# Patient Record
(demographics unavailable — no encounter records)

---

## 2025-03-31 NOTE — DISCUSSION/SUMMARY
[de-identified] : Patient was seen today for evaluation of chronic intermittent left knee pain with recent atraumatic exacerbation due to underlying osteoarthritis.  No recent injury or trauma to the area, no effusion, no evidence of internal derangement.  Patient had very long-lasting relief for 9+ months from cortisone injection provided last visit.  We discussed various treatment options as well as associated risk/benefits/alternatives and patient elected to proceed with repeat cortisone injection today (see procedure note).  Informed the patient that the numbing medicine in today's injection will last for about 4-6 hours. The steroid that was injected will start to work in 1 to 2 days, peak at 1-2 weeks, and may last up to 1-2 months.  We again discussed utilization of low impact exercise, she can continue her walking and [employer] exercise activities.  Follow up as needed.  Patient appreciates and agrees with current plan.  This note was generated using dragon medical dictation software.  A reasonable effort has been made for proofreading its contents, but typos may still remain.  If there are any questions or points of clarification needed please notify my office.

## 2025-03-31 NOTE — PROCEDURE
[de-identified] : Injection: Left knee joint. Indication: Osteoarthritis.  A discussion was had with the patient regarding this procedure and all questions were answered. All risks, benefits and alternatives were discussed. These included but were not limited to bleeding, infection, allergic reaction and reaccumulation of fluid. A timeout was done to ensure correct side and patient agreed to the procedure.  A [place] bernie was created on the skin utilizing a plastic needle cap to bernie the anticipated point of entry.   Alcohol was used to clean the skin, and Betadine was used to sterilize and prep the area in the lateral joint line aspect of the knee. Ethyl chloride spray was then used as a topical anesthetic. A 22-gauge needle was used to inject 2cc of 0.25% bupivacaine without epinephrine and 1cc of 40mg/ml methylprednisolone into the knee. A sterile bandage was then applied. The patient tolerated the procedure well.

## 2025-03-31 NOTE — HISTORY OF PRESENT ILLNESS
[de-identified] : Pt is a 51 yo female who presents with left knee pain  Left knee pain: Started in June 2024 located lateral joint line extending down. Noticed swelling two months denies any injury. The swelling is more bothersome now.  Pain worse with walking or sitting. Has taken tylenol as well as icing and heating the knee with minimal relief. Pt has aspiration in 2022 with significant relief.  She was last seen in August 2024 an received a left knee cortisone injection

## 2025-03-31 NOTE — PHYSICAL EXAM
[de-identified] : Constitutional: Well-nourished, well-developed, No acute distress Respiratory:  Good respiratory effort, no SOB Lymphatic: No regional lymphadenopathy, no lymphedema Psychiatric: Pleasant and normal affect, alert and oriented x3 Musculoskeletal: normal except where as noted in regional exam  Left Knee: APPEARANCE: no marked deformities or malalignment SWELLING:  none  POSITIVE TENDERNESS:  tender lateral joint line.  NONTENDER: medial jt line & retinacula b/l, patellar & quadriceps tendons, MCL/LCL, ITB at the lateral femoral condyle & Gerdy's tubercle, pes bursa.  ROM: mild pain & restriction in flexion to 110 degrees.  RESISTIVE TESTING: painless resisted knee flex/ext.  
normal...

## 2025-06-19 NOTE — ASSESSMENT
[FreeTextEntry1] : Impression and plan Patient came to the office today with complaints of gassy bloating right lower quadrant abdominal discomfort chronic constipation and dyspepsia.  I recommend further testing in the form of upper endoscopy colonoscopy CT scan of the abdomen pelvis and celiac antibody testing.  The patient will forward results of previous blood work performed by primary care physician for my review.  The patient will keep in touch by phone in the interim.  Call back for CT results when available.  Risks benefits alternatives and limitations of endoscopic testing were discussed.

## 2025-06-19 NOTE — HISTORY OF PRESENT ILLNESS
[FreeTextEntry1] : Patient came to the office today to arrange for upper endoscopy and colonoscopy.  She has been experiencing gassy bloating abdominal distention and reflux symptomatology more recently she has been experiencing right lower quadrant abdominal discomfort.  She has chronic constipation with sometimes several days between bowel movements.  The patient denies associated vomiting but does have nausea she has no rectal bleeding but describes pellet-like stools.  There has been no weight loss.